# Patient Record
Sex: FEMALE
[De-identification: names, ages, dates, MRNs, and addresses within clinical notes are randomized per-mention and may not be internally consistent; named-entity substitution may affect disease eponyms.]

---

## 2018-07-20 ENCOUNTER — HOSPITAL ENCOUNTER (EMERGENCY)
Dept: HOSPITAL 31 - C.ER | Age: 23
Discharge: HOME | End: 2018-07-20
Payer: COMMERCIAL

## 2018-07-20 VITALS — RESPIRATION RATE: 18 BRPM

## 2018-07-20 VITALS — SYSTOLIC BLOOD PRESSURE: 122 MMHG | TEMPERATURE: 98.8 F | DIASTOLIC BLOOD PRESSURE: 87 MMHG | HEART RATE: 83 BPM

## 2018-07-20 VITALS — OXYGEN SATURATION: 96 %

## 2018-07-20 DIAGNOSIS — R11.10: ICD-10-CM

## 2018-07-20 DIAGNOSIS — R10.9: Primary | ICD-10-CM

## 2018-07-20 LAB
ALBUMIN SERPL-MCNC: 5.4 G/DL (ref 3.5–5)
ALBUMIN/GLOB SERPL: 1.5 {RATIO} (ref 1–2.1)
ALT SERPL-CCNC: 58 U/L (ref 9–52)
AST SERPL-CCNC: 31 U/L (ref 14–36)
BASOPHILS # BLD AUTO: 0.1 K/UL (ref 0–0.2)
BASOPHILS NFR BLD: 0.5 % (ref 0–2)
BILIRUB UR-MCNC: NEGATIVE MG/DL
BUN SERPL-MCNC: 11 MG/DL (ref 7–17)
CALCIUM SERPL-MCNC: 10.4 MG/DL (ref 8.6–10.4)
EOSINOPHIL # BLD AUTO: 0 K/UL (ref 0–0.7)
EOSINOPHIL NFR BLD: 0.2 % (ref 0–4)
ERYTHROCYTE [DISTWIDTH] IN BLOOD BY AUTOMATED COUNT: 12.7 % (ref 11.5–14.5)
GFR NON-AFRICAN AMERICAN: > 60
GLUCOSE UR STRIP-MCNC: NORMAL MG/DL
HCG,QUALITATIVE URINE: NEGATIVE
HGB BLD-MCNC: 15.8 G/DL (ref 11–16)
LEUKOCYTE ESTERASE UR-ACNC: (no result) LEU/UL
LIPASE: 63 U/L (ref 23–300)
LYMPHOCYTES # BLD AUTO: 1.3 K/UL (ref 1–4.3)
LYMPHOCYTES NFR BLD AUTO: 10.4 % (ref 20–40)
MCH RBC QN AUTO: 31.7 PG (ref 27–31)
MCHC RBC AUTO-ENTMCNC: 35.3 G/DL (ref 33–37)
MCV RBC AUTO: 89.8 FL (ref 81–99)
MONOCYTES # BLD: 0.2 K/UL (ref 0–0.8)
MONOCYTES NFR BLD: 1.6 % (ref 0–10)
NEUTROPHILS # BLD: 11.3 K/UL (ref 1.8–7)
NEUTROPHILS NFR BLD AUTO: 87.3 % (ref 50–75)
NRBC BLD AUTO-RTO: 0 % (ref 0–2)
PH UR STRIP: 5 [PH] (ref 5–8)
PLATELET # BLD: 292 K/UL (ref 130–400)
PMV BLD AUTO: 9 FL (ref 7.2–11.7)
PROT UR STRIP-MCNC: (no result) MG/DL
RBC # BLD AUTO: 4.98 MIL/UL (ref 3.8–5.2)
RBC # UR STRIP: (no result) /UL
SP GR UR STRIP: 1.02 (ref 1–1.03)
SQUAMOUS EPITHIAL: 1 /HPF (ref 0–5)
UROBILINOGEN UR-MCNC: NORMAL MG/DL (ref 0.2–1)
WBC # BLD AUTO: 13 K/UL (ref 4.8–10.8)

## 2018-07-20 PROCEDURE — 84703 CHORIONIC GONADOTROPIN ASSAY: CPT

## 2018-07-20 PROCEDURE — 96375 TX/PRO/DX INJ NEW DRUG ADDON: CPT

## 2018-07-20 PROCEDURE — 74177 CT ABD & PELVIS W/CONTRAST: CPT

## 2018-07-20 PROCEDURE — 99285 EMERGENCY DEPT VISIT HI MDM: CPT

## 2018-07-20 PROCEDURE — 80053 COMPREHEN METABOLIC PANEL: CPT

## 2018-07-20 PROCEDURE — 83690 ASSAY OF LIPASE: CPT

## 2018-07-20 PROCEDURE — 96374 THER/PROPH/DIAG INJ IV PUSH: CPT

## 2018-07-20 PROCEDURE — 85025 COMPLETE CBC W/AUTO DIFF WBC: CPT

## 2018-07-20 PROCEDURE — 96376 TX/PRO/DX INJ SAME DRUG ADON: CPT

## 2018-07-20 PROCEDURE — 81001 URINALYSIS AUTO W/SCOPE: CPT

## 2018-07-20 PROCEDURE — 96361 HYDRATE IV INFUSION ADD-ON: CPT

## 2018-07-20 NOTE — CT
Date of service: 



07/20/2018



PROCEDURE:  CT Abdomen and Pelvis with contrast



HISTORY:

pain



COMPARISON:

None.



TECHNIQUE:

Contrast dose: 100 mL Visipaque 320



Radiation dose:



Total exam DLP = 441.29 mGy-cm.



This CT exam was performed using one or more of the following dose 

reduction techniques: Automated exposure control, adjustment of the 

mA and/or kV according to patient size, and/or use of iterative 

reconstruction technique.



FINDINGS:



LOWER THORAX:

Unremarkable. 



LIVER:

Unremarkable. No gross lesion or ductal dilatation. 



GALLBLADDER AND BILE DUCTS:

Unremarkable. 



PANCREAS:

Unremarkable. No gross lesion or ductal dilatation.



SPLEEN:

Normal size.  Two fluid density masses partially exophytic arising 

from the anterior border of the spleen, 1.7 cm and 1.0 cm in 

diameter.  Nonspecific. 



ADRENALS:

Unremarkable. No mass. 



KIDNEYS AND URETERS:

Unremarkable. No hydronephrosis. No solid mass. 



VASCULATURE:

Unremarkable. No aortic aneurysm. 



BOWEL:

Unremarkable. No obstruction. No gross mural thickening. 



APPENDIX:

Normal appendix. 



PERITONEUM:

Unremarkable. No free fluid. No free air. 



LYMPH NODES:

Unremarkable. No enlarged lymph nodes. 



BLADDER:

Unremarkable. 



REPRODUCTIVE:

Normal uterus 



BONES:

No acute fracture. 



OTHER FINDINGS:

None.



IMPRESSION:

No acute abnormality.  Two incidental small fluid density masses 

arising along the anterior border of the spleen, nonspecific. 

Follow-up advised. No other significant abnormality.

## 2018-07-20 NOTE — C.PDOC
History Of Present Illness


22 y/o female presents to ED with c/o abdominal pain and vomiting since this 

morning. Patient states she had similar symptoms last week at which she was 

evaluated for at [facility], had negative CT scan and given zofran and 

percocet for the symptoms. Symptoms improved for the last week and then return. 

Admits to smoking marijuana use, last 2 weeks ago. Denies alcohol use. Denies 

dysuria, vaginal dishcarge, back pain, fever, chills or any other complaints at 

this time. LMP now 


Time Seen by Provider: 07/20/18 12:37


Chief Complaint (Nursing): Abdominal Pain


History Per: Patient


History/Exam Limitations: no limitations


Onset/Duration Of Symptoms: Hrs


Current Symptoms Are (Timing): Still Present


Location Of Pain/Discomfort: Diffuse





Past Medical History


Reviewed: Historical Data, Nursing Documentation, Vital Signs


Vital Signs: 


 Last Vital Signs











Temp  98.8 F   07/20/18 17:29


 


Pulse  83   07/20/18 17:29


 


Resp  18   07/20/18 17:29


 


BP  122/87   07/20/18 17:29


 


Pulse Ox  100   07/20/18 17:29














- Medical History


PMH: No Chronic Diseases


Surgical History: No Surg Hx


Family History: States: No Known Family Hx





- Social History


Hx Alcohol Use: Yes


Hx Substance Use: No





- Immunization History


Hx Tetanus Toxoid Vaccination: No


Hx Influenza Vaccination: No


Hx Pneumococcal Vaccination: No





Review Of Systems


Constitutional: Negative for: Fever, Chills


Gastrointestinal: Positive for: Vomiting, Abdominal Pain.  Negative for: 

Diarrhea


Genitourinary: Negative for: Dysuria, Vaginal Discharge


Musculoskeletal: Negative for: Back Pain


Skin: Negative for: Rash





Physical Exam





- Physical Exam


Appears: Non-toxic, Other (In painful distress, Actively vomiting)


Skin: Warm, Dry, No Rash


Head: Atraumatic, Normacephalic


Eye(s): bilateral: Normal Inspection, EOMI


Nose: Normal


Oral Mucosa: Moist


Neck: Normal ROM, Supple


Chest: Symmetrical


Cardiovascular: Rhythm Regular


Respiratory: Normal Breath Sounds, No Accessory Muscle Use, No Rales, No Rhonchi

, No Wheezing


Gastrointestinal/Abdominal: Soft, Tenderness (Diffuse), No Guarding, No Rebound


Back: No CVA Tenderness


Extremity: Normal ROM


Neurological/Psych: Oriented x3, Normal Speech, Normal Cognition


Gait: Steady





ED Course And Treatment





- Laboratory Results


Result Diagrams: 


 07/20/18 12:54





 07/20/18 12:54


O2 Sat by Pulse Oximetry: 96 (RA)


Pulse Ox Interpretation: Normal





- CT Scan/US


  ** CT scan abdomen


Other Rad Studies (CT/US): Read By Radiologist, Radiology Report Reviewed


CT/US Interpretation: PROCEDURE:  CT Abdomen and Pelvis with contrast.  HISTORY

:  pain.  COMPARISON:  None.  TECHNIQUE:  Contrast dose: 100 mL Visipaque 320.  

Radiation dose:  Total exam DLP = 441.29 mGy-cm.  This CT exam was performed 

using one or more of the following dose reduction techniques: Automated 

exposure control, adjustment of the mA and/or kV according to patient size, and/

or use of iterative reconstruction technique.  FINDINGS:  LOWER THORAX:  

Unremarkable.  LIVER:  Unremarkable. No gross lesion or ductal dilatation.  

GALLBLADDER AND BILE DUCTS:  Unremarkable.  PANCREAS:  Unremarkable. No gross 

lesion or ductal dilatation.  SPLEEN:  Normal size.  Two fluid density masses 

partially exophytic arising from the anterior border of the spleen, 1.7 cm and 

1.0 cm in diameter.  Nonspecific.  ADRENALS:  Unremarkable. No mass.  KIDNEYS 

AND URETERS:  Unremarkable. No hydronephrosis. No solid mass.  VASCULATURE:  

Unremarkable. No aortic aneurysm.  BOWEL:  Unremarkable. No obstruction. No 

gross mural thickening.  APPENDIX:  Normal appendix.  PERITONEUM:  

Unremarkable. No free fluid. No free air.  LYMPH NODES:  Unremarkable. No 

enlarged lymph nodes.  BLADDER:  Unremarkable.  REPRODUCTIVE:  Normal uterus.  

BONES:  No acute fracture.  OTHER FINDINGS:  None.  IMPRESSION:  No acute 

abnormality.  Two incidental small fluid density masses arising along the 

anterior border of the spleen, nonspecific. Follow-up advised. No other 

significant abnormality.


Progress Note: Toradol, Zofran, and IV fluids administered.  On re evaluation 

patient continues to vomit and pain persists. CT abdomen ordered. Reglan and 

Benadryl ordered.  On re-evaluation, pt appears to be sleeping. When awoken , 

states pain and vomiting persist.  Discussed CT results and copy given.  Pt was 

offered admission but refused noting she needs to go to her mothers wedding. 

Case discussed with Dr Merchant, agreed upon plan and treatment.





Medical Decision Making


Medical Decision Making: 





patient declines admission to the hospital and wishes to leave the Emergency


Department. This action is against my medical advice to the patient and the 

decision was


made with informed refusal. The patient was told that admission is necessary 

and a full


explanation of the rationale was given. The risks of leaving were explained to 

the patient  and include, but are not limited to, worsening of known or 

currently


unknown conditions, permanent disability and death from undiagnosed or untreated


conditions . The patient has the capacity to make this informed decision


and understands the clinical situation and my explanation of the risks of 

leaving. The patient


voluntarily accepts these risks and a signed AMA form documenting our 

conversation was  obtained. The patient was given the opportunity to ask


questions and reconsider. The patient was encouraged to return to the Emergency


Department at any time for further care.





Disposition





- Disposition


Referrals: 


Tyshawn Majano MD [Medical Doctor] - 


Disposition: HOME/ ROUTINE


Disposition Time: 17:05


Condition: STABLE


Additional Instructions: 


Follow up with your primary doctor/GI in 1-2 days. Show them the results of 

your CT scan.  Return to ER if symptoms persist or worsen. 


Prescriptions: 


Dicyclomine [Bentyl] 10 mg PO QID #20 cap


Metoclopramide [Reglan] 1 tab PO BID PRN #10 tab


 PRN Reason: Nausea/Vomiting


Instructions:  Acute Abdomen (Belly Pain), Adult (DC)


Forms:  [employer] (English), (AMA) Informed Refusal





- Clinical Impression


Clinical Impression: 


 Abdominal pain, Vomiting








- PA / NP / Resident Statement


MD/DO has reviewed & agrees with the documentation as recorded.





- Scribe Statement


The provider has reviewed the documentation as recorded by the Alfonso Shea





All medical record entries made by the Alfonso were at my direction and 

personally dictated by me. I have reviewed the chart and agree that the record 

accurately reflects my personal performance of the history, physical exam, 

medical decision making, and the department course for this patient. I have 

also personally directed, reviewed, and agree with the discharge instructions 

and disposition.